# Patient Record
Sex: FEMALE | Race: ASIAN | NOT HISPANIC OR LATINO | Employment: FULL TIME | URBAN - METROPOLITAN AREA
[De-identification: names, ages, dates, MRNs, and addresses within clinical notes are randomized per-mention and may not be internally consistent; named-entity substitution may affect disease eponyms.]

---

## 2019-07-09 ENCOUNTER — OFFICE VISIT (OUTPATIENT)
Dept: FAMILY MEDICINE | Facility: CLINIC | Age: 44
End: 2019-07-09
Payer: COMMERCIAL

## 2019-07-09 VITALS
HEIGHT: 64 IN | BODY MASS INDEX: 34.31 KG/M2 | TEMPERATURE: 98.7 F | SYSTOLIC BLOOD PRESSURE: 144 MMHG | WEIGHT: 201 LBS | DIASTOLIC BLOOD PRESSURE: 82 MMHG | HEART RATE: 94 BPM | OXYGEN SATURATION: 99 %

## 2019-07-09 DIAGNOSIS — R07.0 THROAT PAIN: Primary | ICD-10-CM

## 2019-07-09 DIAGNOSIS — J02.9 ACUTE PHARYNGITIS, UNSPECIFIED ETIOLOGY: ICD-10-CM

## 2019-07-09 LAB
DEPRECATED S PYO AG THROAT QL EIA: NORMAL
SPECIMEN SOURCE: NORMAL

## 2019-07-09 PROCEDURE — 87880 STREP A ASSAY W/OPTIC: CPT | Performed by: FAMILY MEDICINE

## 2019-07-09 PROCEDURE — 87081 CULTURE SCREEN ONLY: CPT | Performed by: FAMILY MEDICINE

## 2019-07-09 PROCEDURE — 99203 OFFICE O/P NEW LOW 30 MIN: CPT | Performed by: FAMILY MEDICINE

## 2019-07-09 RX ORDER — AZITHROMYCIN 250 MG/1
TABLET, FILM COATED ORAL
Qty: 6 TABLET | Refills: 0 | Status: SHIPPED | OUTPATIENT
Start: 2019-07-09 | End: 2023-03-16

## 2019-07-09 ASSESSMENT — MIFFLIN-ST. JEOR: SCORE: 1557.76

## 2019-07-09 NOTE — PROGRESS NOTES
"Subjective     Aleta South is a 43 year old female who presents to clinic today for the following health issues:    HPI   Acute Illness   Acute illness concerns: possible strep  Onset: yesterday    Fever: no    Chills/Sweats: no    Headache (location?): no    Sinus Pressure:no    Conjunctivitis:  no    Ear Pain: YES: right    Rhinorrhea: no    Congestion: YES    Sore Throat: YES     Cough: YES-non-productive    Wheeze: YES    Decreased Appetite: YES    Nausea: no    Vomiting: no    Diarrhea:  no    Dysuria/Freq.: no    Fatigue/Achiness: YES    Sick/Strep Exposure: no     Therapies Tried and outcome: ibuprofen    There is no problem list on file for this patient.    History reviewed. No pertinent surgical history.    Social History     Tobacco Use     Smoking status: Never Smoker     Smokeless tobacco: Never Used   Substance Use Topics     Alcohol use: Yes     History reviewed. No pertinent family history.      Reviewed and updated as needed this visit by Provider  Tobacco  Allergies  Meds  Problems  Med Hx  Surg Hx  Fam Hx         Review of Systems   ROS COMP: Constitutional, HEENT, cardiovascular, pulmonary, gi and gu systems are negative, except as otherwise noted.      Objective    /82   Pulse 94   Temp 98.7  F (37.1  C) (Oral)   Ht 1.619 m (5' 3.75\")   Wt 91.2 kg (201 lb)   SpO2 99%   BMI 34.77 kg/m    Body mass index is 34.77 kg/m .  Physical Exam   GENERAL: healthy, alert and no distress  EYES: Eyes grossly normal to inspection, PERRL and conjunctivae and sclerae normal  HENT: normal cephalic/atraumatic, ear canals and TM's normal, nasal mucosa edematous , oropharynx clear, oral mucous membranes moist and tonsillar hypertrophy  NECK: no adenopathy and no asymmetry, masses, or scars  RESP: lungs clear to auscultation - no rales, rhonchi or wheezes  CV: regular rate and rhythm, normal S1 S2, no S3 or S4, no murmur, click or rub, no peripheral edema and peripheral pulses strong  MS: no " gross musculoskeletal defects noted, no edema  PSYCH: mentation appears normal, affect normal/bright  Voice is somewhat muffled when she talks    Diagnostic Test Results:  Strep screen - Negative        Assessment & Plan     1. Throat pain  - Strep, Rapid Screen  - Beta strep group A culture    2. Acute pharyngitis, unspecified etiology: given rapid onset and voice changes due to painful throat, treat for suspected bacterial etiology. Will follow up on culture. Otherwise, continue symptomatic cares - PRN ibuprofen/acetaminophen, salt water gargles, push fluids  - azithromycin (ZITHROMAX) 250 MG tablet; Two tablets first day, then one tablet daily for four days.  Dispense: 6 tablet; Refill: 0         Return in about 1 week (around 7/16/2019) for follow up if symptoms not improving.    Daniel Yao,   [facility] MARCIO

## 2019-07-10 LAB
BACTERIA SPEC CULT: NORMAL
SPECIMEN SOURCE: NORMAL

## 2019-12-23 ENCOUNTER — TELEPHONE (OUTPATIENT)
Dept: FAMILY MEDICINE | Facility: CLINIC | Age: 44
End: 2019-12-23

## 2019-12-23 NOTE — TELEPHONE ENCOUNTER
Needs of attention regarding:  -Cervical Cancer Screening  -Wellness (Physical) Visit     Health Maintenance Topics with due status: Overdue       Topic Date Due    PREVENTIVE CARE VISIT 1977    DTAP/TDAP/TD IMMUNIZATION 12/23/1988    HIV SCREENING 12/23/1992    HPV 12/23/1998    PAP 12/23/2002    PHQ-2 01/01/2019    INFLUENZA VACCINE 09/01/2019       Communication:  See Letter

## 2019-12-23 NOTE — LETTER
[facility]  [address]  [place] [id number] [phone]  December 23, 2019    Aleta South  [address]  [place] [address]    Dear Aleta,    I care about your health and have reviewed your health plan. I have reviewed your medical conditions, medication list, and lab results and am making recommendations based on this review, to better manage your health.    You are in particular need of attention regarding:  -Cervical Cancer Screening  -Wellness (Physical) Visit   I am recommending that you:  -schedule a WELLNESS (Physical) APPOINTMENT with me.   I will check fasting labs the same day - nothing to eat except water and meds for 8-10 hours prior.    -schedule a PAP SMEAR EXAM which is due.  Please disregard this reminder if you have had this exam elsewhere within the last year.  It would be helpful for us to have a copy of your recent pap smear report in our file so that we can best coordinate your care.    If you are under/uninsured, we recommend you contact the [facility]. They offer pap smears at no charge or on a sliding fee charge. You can schedule with them at [phone]. Please have them send us the results.  Please call us at [phone] (or use [facility]) to address the above recommendations.   Thank you for trusting [facility] and we appreciate the opportunity to serve you.  We look forward to supporting your healthcare needs in the future.    Healthy Regards,    [facility] Care Team

## 2021-04-17 ENCOUNTER — IMMUNIZATION (OUTPATIENT)
Dept: NURSING | Facility: CLINIC | Age: 46
End: 2021-04-17
Payer: COMMERCIAL

## 2021-04-17 PROCEDURE — 0011A PR COVID VAC MODERNA 100 MCG/0.5 ML IM: CPT

## 2021-04-17 PROCEDURE — 91301 PR COVID VAC MODERNA 100 MCG/0.5 ML IM: CPT

## 2021-05-08 ENCOUNTER — HEALTH MAINTENANCE LETTER (OUTPATIENT)
Age: 46
End: 2021-05-08

## 2021-05-15 ENCOUNTER — IMMUNIZATION (OUTPATIENT)
Dept: NURSING | Facility: CLINIC | Age: 46
End: 2021-05-15
Attending: INTERNAL MEDICINE
Payer: COMMERCIAL

## 2021-05-15 PROCEDURE — 0012A PR COVID VAC MODERNA 100 MCG/0.5 ML IM: CPT

## 2021-05-15 PROCEDURE — 91301 PR COVID VAC MODERNA 100 MCG/0.5 ML IM: CPT

## 2021-10-23 ENCOUNTER — HEALTH MAINTENANCE LETTER (OUTPATIENT)
Age: 46
End: 2021-10-23

## 2022-01-24 ENCOUNTER — NURSE TRIAGE (OUTPATIENT)
Dept: NURSING | Facility: CLINIC | Age: 47
End: 2022-01-24
Payer: COMMERCIAL

## 2022-01-24 NOTE — TELEPHONE ENCOUNTER
Patient injured her foot and has an appointment for Wed of this week. Calling today to see if she can go to an ortho specialist. Advised patient see a primary provider first.  No fever or redness or warmth.    No triage done.    Tonya Torres RN  01/24/22 11:59 AM  [facility] Nurse Advisor    Additional Information    Information only question and nurse able to answer    Protocols used: INFORMATION ONLY CALL - NO TRIAGE-A-OH    COVID 19 Nurse Triage Plan/Patient Instructions    Please be aware that novel coronavirus (COVID-19) may be circulating in the community. If you develop symptoms such as fever, cough, or SOB or if you have concerns about the presence of another infection including coronavirus (COVID-19), please contact your health care provider or visit [email or web address].     Disposition/Instructions    In-Person Visit with provider recommended. Reference Visit Selection Guide.    Thank you for taking steps to prevent the spread of this virus.  o Limit your contact with others.  o Wear a simple mask to cover your cough.  o Wash your hands well and often.    Resources    [facility]: About COVID-19: [email or web address]    CDC: What to Do If You're Sick: www.cdc.gov/coronavirus/2019-ncov/about/steps-when-sick.html    CDC: Ending Home Isolation: www.cdc.gov/coronavirus/2019-ncov/hcp/disposition-in-home-patients.html     CDC: Caring for Someone: www.cdc.gov/coronavirus/2019-ncov/if-you-are-sick/care-for-someone.html     [facility]: Interim Guidance for Hospital Discharge to Home: www.health.[facility].[place].us/diseases/coronavirus/hcp/hospdischarge.pdf    [facility] clinical trials (COVID-19 research studies): [email or web address]/umn-[email or web address]-trials     Below are the COVID-19 hotlines at the [facility] ([facility]). Interpreters are available.   o For health questions: Call 163-933-7342 or [phone] (7 a.m. to 7 p.m.)  o For questions about schools and  childcare: Call 643-187-6563 or [phone] (7 a.m. to 7 p.m.)

## 2022-06-04 ENCOUNTER — HEALTH MAINTENANCE LETTER (OUTPATIENT)
Age: 47
End: 2022-06-04

## 2022-10-10 ENCOUNTER — HEALTH MAINTENANCE LETTER (OUTPATIENT)
Age: 47
End: 2022-10-10

## 2023-03-13 ENCOUNTER — OFFICE VISIT (OUTPATIENT)
Dept: FAMILY MEDICINE | Facility: CLINIC | Age: 48
End: 2023-03-13
Payer: COMMERCIAL

## 2023-03-13 ENCOUNTER — ANCILLARY PROCEDURE (OUTPATIENT)
Dept: GENERAL RADIOLOGY | Facility: CLINIC | Age: 48
End: 2023-03-13
Attending: FAMILY MEDICINE
Payer: COMMERCIAL

## 2023-03-13 ENCOUNTER — TELEPHONE (OUTPATIENT)
Dept: FAMILY MEDICINE | Facility: CLINIC | Age: 48
End: 2023-03-13

## 2023-03-13 ENCOUNTER — TELEPHONE (OUTPATIENT)
Dept: DERMATOLOGY | Facility: CLINIC | Age: 48
End: 2023-03-13

## 2023-03-13 VITALS
SYSTOLIC BLOOD PRESSURE: 150 MMHG | WEIGHT: 224.3 LBS | BODY MASS INDEX: 38.29 KG/M2 | HEIGHT: 64 IN | TEMPERATURE: 98.8 F | HEART RATE: 83 BPM | DIASTOLIC BLOOD PRESSURE: 108 MMHG | RESPIRATION RATE: 18 BRPM | OXYGEN SATURATION: 98 %

## 2023-03-13 DIAGNOSIS — M79.672 LEFT FOOT PAIN: ICD-10-CM

## 2023-03-13 DIAGNOSIS — Z13.9 SCREENING FOR CONDITION: ICD-10-CM

## 2023-03-13 DIAGNOSIS — I10 BENIGN ESSENTIAL HYPERTENSION: ICD-10-CM

## 2023-03-13 DIAGNOSIS — S92.355A CLOSED NONDISPLACED FRACTURE OF FIFTH METATARSAL BONE OF LEFT FOOT, INITIAL ENCOUNTER: ICD-10-CM

## 2023-03-13 DIAGNOSIS — L72.3 SEBACEOUS CYST: ICD-10-CM

## 2023-03-13 DIAGNOSIS — S92.355D CLOSED NONDISPLACED FRACTURE OF FIFTH METATARSAL BONE OF LEFT FOOT WITH ROUTINE HEALING, SUBSEQUENT ENCOUNTER: Primary | ICD-10-CM

## 2023-03-13 DIAGNOSIS — M79.672 LEFT FOOT PAIN: Primary | ICD-10-CM

## 2023-03-13 LAB
ANION GAP SERPL CALCULATED.3IONS-SCNC: 13 MMOL/L (ref 7–15)
BUN SERPL-MCNC: 6.7 MG/DL (ref 6–20)
CALCIUM SERPL-MCNC: 9.7 MG/DL (ref 8.6–10)
CHLORIDE SERPL-SCNC: 103 MMOL/L (ref 98–107)
CHOLEST SERPL-MCNC: 250 MG/DL
CREAT SERPL-MCNC: 0.68 MG/DL (ref 0.51–0.95)
DEPRECATED HCO3 PLAS-SCNC: 23 MMOL/L (ref 22–29)
GFR SERPL CREATININE-BSD FRML MDRD: >90 ML/MIN/1.73M2
GLUCOSE SERPL-MCNC: 119 MG/DL (ref 70–99)
HDLC SERPL-MCNC: 45 MG/DL
LDLC SERPL CALC-MCNC: 164 MG/DL
NONHDLC SERPL-MCNC: 205 MG/DL
POTASSIUM SERPL-SCNC: 4 MMOL/L (ref 3.4–5.3)
SODIUM SERPL-SCNC: 139 MMOL/L (ref 136–145)
TRIGL SERPL-MCNC: 204 MG/DL

## 2023-03-13 PROCEDURE — 80061 LIPID PANEL: CPT | Performed by: FAMILY MEDICINE

## 2023-03-13 PROCEDURE — 36415 COLL VENOUS BLD VENIPUNCTURE: CPT | Performed by: FAMILY MEDICINE

## 2023-03-13 PROCEDURE — 73630 X-RAY EXAM OF FOOT: CPT | Mod: TC | Performed by: RADIOLOGY

## 2023-03-13 PROCEDURE — 80048 BASIC METABOLIC PNL TOTAL CA: CPT | Performed by: FAMILY MEDICINE

## 2023-03-13 PROCEDURE — 99203 OFFICE O/P NEW LOW 30 MIN: CPT | Performed by: FAMILY MEDICINE

## 2023-03-13 RX ORDER — LISINOPRIL 10 MG/1
10 TABLET ORAL DAILY
Qty: 90 TABLET | Refills: 1 | Status: SHIPPED | OUTPATIENT
Start: 2023-03-13 | End: 2023-09-03

## 2023-03-13 RX ORDER — SULFAMETHOXAZOLE/TRIMETHOPRIM 800-160 MG
1 TABLET ORAL 2 TIMES DAILY
Qty: 14 TABLET | Refills: 0 | Status: SHIPPED | OUTPATIENT
Start: 2023-03-13

## 2023-03-13 ASSESSMENT — PAIN SCALES - GENERAL: PAINLEVEL: NO PAIN (0)

## 2023-03-13 ASSESSMENT — ENCOUNTER SYMPTOMS
AGITATION: 0
COLOR CHANGE: 1

## 2023-03-13 NOTE — PROGRESS NOTES
"  Assessment & Plan     Left foot pain    - XR Foot Left G/E 3 Views; Future  - Ankle/Foot Bracing Supplies DME Walking Boot; Left; Pneumatic; Short    Sebaceous cyst  - no fluctuation     - Adult Dermatology Referral; Future  - sulfamethoxazole-trimethoprim (BACTRIM DS) 800-160 MG tablet; Take 1 tablet by mouth 2 times daily    Benign essential hypertension  Blood pressure above goal .  Patient will make follow up with primary at [place] for follow up   - lisinopril (ZESTRIL) 10 MG tablet; Take 1 tablet (10 mg) by mouth daily  - Basic metabolic panel  (Ca, Cl, CO2, Creat, Gluc, K, Na, BUN)    Screening for condition  - Lipid panel reflex to direct LDL Non-fasting    Closed nondisplaced fracture of fifth metatarsal bone of left foot, initial encounter  - 5th metatarsal fracture   Mild displacement   Rest , ice , non weight bearing   Repeat xray 1 week .       BMI:   Estimated body mass index is 38.2 kg/m  as calculated from the following:    Height as of this encounter: 1.632 m (5' 4.25\").    Weight as of this encounter: 101.7 kg (224 lb 4.8 oz).   Weight management plan: Discussed healthy diet and exercise guidelines      Return in about 4 weeks (around 4/10/2023) for Follow up.    Rosa Perez MD  [facility] NASEEM River is a 47 year old, presenting for the following health issues:  Foot Burn (Left foot 5 metatarsal bruising and pain post a fall 3/12/2023. ) and Mass (On the back )      Mass    History of Present Illness       Reason for visit:  Hurt my foot  Symptom onset:  1-3 days ago    She eats 4 or more servings of fruits and vegetables daily.She consumes 2 sweetened beverage(s) daily.She exercises with enough effort to increase her heart rate 20 to 29 minutes per day.  She exercises with enough effort to increase her heart rate 3 or less days per week.   She is taking medications regularly.         Review of Systems   Musculoskeletal:        Left foot pain , erythema " ".   Skin: Positive for color change.   Psychiatric/Behavioral: Negative for agitation and behavioral problems.            Objective    BP (!) 150/108 (BP Location: Right arm, Patient Position: Sitting, Cuff Size: Adult Large)   Pulse 83   Temp 98.8  F (37.1  C) (Oral)   Resp 18   Ht 1.632 m (5' 4.25\")   Wt 101.7 kg (224 lb 4.8 oz)   LMP 03/06/2023 (Approximate)   SpO2 98%   BMI 38.20 kg/m    Body mass index is 38.2 kg/m .  Physical Exam  Cardiovascular:      Rate and Rhythm: Normal rate and regular rhythm.      Pulses: Normal pulses.   Pulmonary:      Effort: Pulmonary effort is normal.   Abdominal:      General: Abdomen is flat.   Musculoskeletal:      Comments: Erythema ,bruising 5 th metatarsal .   Skin:     General: Skin is warm.      Findings: Bruising and erythema present.                "

## 2023-03-13 NOTE — TELEPHONE ENCOUNTER
----- Message from Rosa Perez MD sent at 3/13/2023  2:28 PM CDT -----  Team     I was unable to reach patient .   Please explain her metatarsal fracture is mildly displaced as discussed in visit .  Usually for mild displacement we recommend xray in 1 week to make sure there is no further displacement .    Patient option is to see a foot doctor or I can order that xray for follow up in 1 week .  Will place order once agreed by patient .    Thanks   Rosa Perez MD.

## 2023-03-13 NOTE — TELEPHONE ENCOUNTER
Attempted to call pt, LVMTCB.     Aroldo ROONEY RN     MEDICATIONS  (STANDING):  Desonide ointment 0.05% 1 Application(s) 1 Application(s) Topical daily  loratadine 10 milliGRAM(s) Oral daily  pimecrolimus 1% Cream 1 Application(s) Topical daily  sertraline 125 milliGRAM(s) Oral daily    MEDICATIONS  (PRN):  ALBUTerol    90 MICROgram(s) HFA Inhaler 2 Puff(s) Inhalation every 6 hours PRN asthma  aluminum hydroxide/magnesium hydroxide/simethicone Suspension 30 milliLiter(s) Oral every 6 hours PRN Dyspepsia  chlorproMAZINE    Injectable 50 milliGRAM(s) IntraMuscular once PRN agitation  chlorproMAZINE    Tablet 50 milliGRAM(s) Oral every 6 hours PRN agitation, anxiety  diphenhydrAMINE 50 milliGRAM(s) Oral every 6 hours PRN agitation  diphenhydrAMINE Injectable 50 milliGRAM(s) IntraMuscular once PRN Agitation  ibuprofen  Tablet. 200 milliGRAM(s) Oral every 6 hours PRN Mild Pain (1 - 3), Moderate Pain (4 - 6)  LORazepam     Tablet 2 milliGRAM(s) Oral every 6 hours PRN agitation, anxiety  ondansetron    Tablet 8 milliGRAM(s) Oral every 12 hours PRN Nausea  senna 2 Tablet(s) Oral at bedtime PRN Constipation

## 2023-03-13 NOTE — TELEPHONE ENCOUNTER
Spoke to patient . Repeat xray in 1 week as there is some displacement in the metatarsal fracture .

## 2023-03-13 NOTE — TELEPHONE ENCOUNTER
This encounter is being sent to inform the clinic that this patient has a referral from DOMINIQUE ARMENDARIZ for the diagnoses of L72.3 (ICD-10-CM) - Sebaceous cyst and has requested that this patient be seen within Priority: 1-2 Weeks .  Based on the availability of our provider(s), we are unable to accommodate this request.      Were all sites offered this patient?  Yes      Does scheduling algorithm request to schedule next available?  Patient appointment has not been scheduled.  Please review the referral request for accommodation and contact the patient.  If unable to accommodate, please resubmit a referral and indicate a preferred partner or affiliate location using Provider Finder or Scheduling Instructions field.

## 2023-03-16 ENCOUNTER — OFFICE VISIT (OUTPATIENT)
Dept: DERMATOLOGY | Facility: CLINIC | Age: 48
End: 2023-03-16
Payer: COMMERCIAL

## 2023-03-16 DIAGNOSIS — L72.0 EIC (EPIDERMAL INCLUSION CYST): ICD-10-CM

## 2023-03-16 DIAGNOSIS — L70.0 ACNE VULGARIS: Primary | ICD-10-CM

## 2023-03-16 PROCEDURE — 11900 INJECT SKIN LESIONS </W 7: CPT | Mod: GC | Performed by: STUDENT IN AN ORGANIZED HEALTH CARE EDUCATION/TRAINING PROGRAM

## 2023-03-16 PROCEDURE — 99204 OFFICE O/P NEW MOD 45 MIN: CPT | Mod: 25 | Performed by: STUDENT IN AN ORGANIZED HEALTH CARE EDUCATION/TRAINING PROGRAM

## 2023-03-16 RX ORDER — DOXYCYCLINE 100 MG/1
CAPSULE ORAL
Qty: 60 CAPSULE | Refills: 0 | Status: SHIPPED | OUTPATIENT
Start: 2023-03-16

## 2023-03-16 NOTE — LETTER
3/16/2023       RE: Aleta South  [address]  [place] [address]     Dear Colleague,    Thank you for referring your patient, Aleta South, to the [facility] [place] [facility]. Please see a copy of my visit note below.    [facility] Dermatology Note  Encounter Date: Mar 16, 2023  Date of Last Dermatology Office Visit: Visit date not found     Dermatology Problem List:  #. EIC, inflamed   S/p 60 day course of doxycycline started 3/16/2023   Plan for excision for definitive removal, surgery scheduling pool messaged  # Acne, upper back  Start BPO wash and clindamycin lotion.     ____________________________________________    Assessment & Plan:     #. Epidermal inclusion cyst, inflamed  Counseled patient on etiology and expectations, advising that we treat with a course of antibiotics, largely for anti-inflammatory purposes   -Start doxycycline 100 mg BID for one month.   -S/p injection 0.7 ml of ILK 10 today; see procedure note below.     #. Acne vulgaris  Assessment:   Primarily affecting the upper back; without evidence of scarring. Subtype best categorized as inflammatory. Patient without notable psychological burden from condition.   Plan:   -Topical treatments:  -Start benzoyl peroxide wash, 5%, to be used once daily.   -Start topical clindamycin, to be used twice daily.     Procedures Performed:  - Intra-lesional triamcinolone procedure note. After verbal consent and review of risk of pain and skin thinning/atrophy, positioning and cleansing with isopropyl alcohol, 0.7 total mL of triamcinolone 10 mg/mL was injected into 1 lesion(s) on the back. The patient tolerated the procedure well and left the dermatology clinic in good condition.    Follow-up: for cyst excision, likely in early May (soonest available).     Staff and Resident:     Staff: Dr. Feliberto Giang MD  PGY-3,  Internal Medicine-Dermatology  Pager: TextPage Link  ____________________________________________    CC: Derm Problem (Cyst on back that patient has a concern about. Patient stated that the cyst is currently inflamed and has been present since February 2023.)      HPI:  Ms. Aleta South is a(n) 47 year old female who presents today as a new patient (or re-establishing care following two year hiatus) for the following chief complaint(s):     Derm Problem (Cyst on back that patient has a concern about. Patient stated that the cyst is currently inflamed and has been present since February 2023.)    Patient was started on Bactrim by an outside provider about 5 days ago, she notes that since starting the medication, there has been minimal improvement in the inflammation or pain of the lesion, however she cannot see it very well.  Notes that she gets acne on the upper back and shoulders frequently, which she attributes to sweating after working out.    Patient is otherwise feeling well, without additional skin concerns.    Labs Reviewed:  N/A    Physical Exam:  Vitals: LMP 03/06/2023 (Approximate)   General: Well developed adult. Resting comfortably; no acute distress. Conversational and cooperative with exam.  HEENT: Anicteric sclera. EOMI. Mucous membranes moist.   Skin Exam: A focused exam of the the back was performed.   - Foster Type IV: Sun-occluded skin with moderate background pigmentation. .   - Patient with greater than 100 nevi  - At the right lower scapula there is a 3-4 mm erythematous nodule, slightly tender to palpation. Central punctum observed.   - At the upper back and shoulders there are several erythematous papules and pustules in various stages of h ealing.   - No other lesions of concern on areas examined.   Psych: Appropriate mood and affect for situation.    Medications:  Current Outpatient Medications   Medication     doxycycline monohydrate (MONODOX) 100 MG capsule      sulfamethoxazole-trimethoprim (BACTRIM DS) 800-160 MG tablet     lisinopril (ZESTRIL) 10 MG tablet     Current Facility-Administered Medications   Medication     triamcinolone acetonide (KENALOG-10) injection 10 mg        Past Medical History:   There is no problem list on file for this patient.    History reviewed. No pertinent past medical history.    CC Rosa Perez MD  [address] [place] [address] on close of this encounter.    Drug Administration Record    Prior to injection, verified patient identity using patient's name and date of birth.  Due to injection administration, patient instructed to remain in clinic for 15 minutes  afterwards, and to report any adverse reaction to me immediately.    Drug Name: triamcinolone acetonide(kenalog)  Dose: 1 mL of triamcinolone 10mg/mL, 10 mg dose  Route administered: See provider notes  NDC #: Kenalog-10 (6967-7259-59)  Amount of waste(mL):4 mL  Reason for waste: Multi dose vial    LOT #: [id number]  SITE: See provider notes  : [employer]  EXPIRATION DATE: 01 Aug 2024      Attestation signed by Feliberto Moon MD at 3/16/2023 11:15 AM:  I have personally examined this patient and agree with the resident doctor's documentation and plan of care. I have reviewed and amended the resident's note. The documentation accurately reflects my clinical observations, diagnoses, treatment and follow-up plans. I was present for key portions of the procedure(s).       Feliberto Moon MD  Dermatology Staff

## 2023-03-16 NOTE — NURSING NOTE
Dermatology Rooming Note    Aleta South's goals for this visit include:   Chief Complaint   Patient presents with     Derm Problem     Cyst on back that patient has a concern about. Patient stated that the cyst is currently inflamed and has been present since February 2023.     Kyle Horowitz, EMT-B

## 2023-03-16 NOTE — Clinical Note
Ready for signing Dr. Mono. Schedulers, please overbook in Dr. Sosa's next surgery clinic. Case was discussed and permission obtained.

## 2023-03-16 NOTE — PATIENT INSTRUCTIONS
Start taking the doxycycline 100mg twice a day with food for the next month.     Start the BPO wash and the clindamycin lotion to other acne areas at the upper back.     Our surgery schedulers will be reaching out to you when we find an open slot for your procedure.     Your cyst may start to drain; if it does, just apply Band-Aid and warm compresses as needed. Do not try to manually extract it yourself.

## 2023-03-16 NOTE — PROGRESS NOTES
[facility] Dermatology Note  Encounter Date: Mar 16, 2023  Date of Last Dermatology Office Visit: Visit date not found     Dermatology Problem List:  #. EIC, inflamed   S/p 60 day course of doxycycline started 3/16/2023   Plan for excision for definitive removal, surgery scheduling pool messaged  # Acne, upper back  Start BPO wash and clindamycin lotion.     ____________________________________________    Assessment & Plan:     #. Epidermal inclusion cyst, inflamed  Counseled patient on etiology and expectations, advising that we treat with a course of antibiotics, largely for anti-inflammatory purposes   -Start doxycycline 100 mg BID for one month.   -S/p injection 0.7 ml of ILK 10 today; see procedure note below.     #. Acne vulgaris  Assessment:   Primarily affecting the upper back; without evidence of scarring. Subtype best categorized as inflammatory. Patient without notable psychological burden from condition.   Plan:   -Topical treatments:  -Start benzoyl peroxide wash, 5%, to be used once daily.   -Start topical clindamycin, to be used twice daily.     Procedures Performed:  - Intra-lesional triamcinolone procedure note. After verbal consent and review of risk of pain and skin thinning/atrophy, positioning and cleansing with isopropyl alcohol, 0.7 total mL of triamcinolone 10 mg/mL was injected into 1 lesion(s) on the back. The patient tolerated the procedure well and left the dermatology clinic in good condition.    Follow-up: for cyst excision, likely in early May (soonest available).     Staff and Resident:     Staff: Dr. Feliberto Giang MD  PGY-3, Internal Medicine-Dermatology  Pager: TextPage Link  ____________________________________________    CC: Derm Problem (Cyst on back that patient has a concern about. Patient stated that the cyst is currently inflamed and has been present since February 2023.)      HPI:  Ms. Aleta South is a(n) 47 year old female  who presents today as a new patient (or re-establishing care following two year hiatus) for the following chief complaint(s):     Derm Problem (Cyst on back that patient has a concern about. Patient stated that the cyst is currently inflamed and has been present since February 2023.)    Patient was started on Bactrim by an outside provider about 5 days ago, she notes that since starting the medication, there has been minimal improvement in the inflammation or pain of the lesion, however she cannot see it very well.  Notes that she gets acne on the upper back and shoulders frequently, which she attributes to sweating after working out.    Patient is otherwise feeling well, without additional skin concerns.    Labs Reviewed:  N/A    Physical Exam:  Vitals: LMP 03/06/2023 (Approximate)   General: Well developed adult. Resting comfortably; no acute distress. Conversational and cooperative with exam.  HEENT: Anicteric sclera. EOMI. Mucous membranes moist.   Skin Exam: A focused exam of the the back was performed.   - Foster Type IV: Sun-occluded skin with moderate background pigmentation. .   - Patient with greater than 100 nevi  - At the right lower scapula there is a 3-4 mm erythematous nodule, slightly tender to palpation. Central punctum observed.   - At the upper back and shoulders there are several erythematous papules and pustules in various stages of h ealing.   - No other lesions of concern on areas examined.   Psych: Appropriate mood and affect for situation.    Medications:  Current Outpatient Medications   Medication     doxycycline monohydrate (MONODOX) 100 MG capsule     sulfamethoxazole-trimethoprim (BACTRIM DS) 800-160 MG tablet     lisinopril (ZESTRIL) 10 MG tablet     Current Facility-Administered Medications   Medication     triamcinolone acetonide (KENALOG-10) injection 10 mg        Past Medical History:   There is no problem list on file for this patient.    History reviewed. No pertinent past  medical history.    CC Rosa Perez MD  [address] [place] [address] on close of this encounter.

## 2023-03-16 NOTE — PROGRESS NOTES
Drug Administration Record    Prior to injection, verified patient identity using patient's name and date of birth.  Due to injection administration, patient instructed to remain in clinic for 15 minutes  afterwards, and to report any adverse reaction to me immediately.    Drug Name: triamcinolone acetonide(kenalog)  Dose: 1 mL of triamcinolone 10mg/mL, 10 mg dose  Route administered: See provider notes  NDC #: Kenalog-10 (3352-7138-50)  Amount of waste(mL):4 mL  Reason for waste: Multi dose vial    LOT #: [id number]  SITE: See provider notes  : [employer]  EXPIRATION DATE: 01 Aug 2024

## 2023-03-20 ENCOUNTER — TELEPHONE (OUTPATIENT)
Dept: DERMATOLOGY | Facility: CLINIC | Age: 48
End: 2023-03-20
Payer: COMMERCIAL

## 2023-03-20 NOTE — TELEPHONE ENCOUNTER
Left vm to call back and Schedule with DR. Sosa.     Ready for signing Dr. Moon. Schedulers, please overbook in Dr. Sosa's next surgery clinic. Case was discussed and permission obtained.       Morena Núñez, Procedure  3/20/2023 10:45 AM

## 2023-03-21 NOTE — TELEPHONE ENCOUNTER
Called and spoke to pt. Scheduled for procedure on 05/04 with Dr. Sosa.     Morena Núñez, Procedure  3/21/2023 1:28 PM

## 2023-03-25 ENCOUNTER — HEALTH MAINTENANCE LETTER (OUTPATIENT)
Age: 48
End: 2023-03-25

## 2023-06-11 ENCOUNTER — HEALTH MAINTENANCE LETTER (OUTPATIENT)
Age: 48
End: 2023-06-11

## 2023-07-19 NOTE — TELEPHONE ENCOUNTER
Pt returning call, relayed message below. Pt would like clarification as she thought it was discussed to follow up in one month, please clarify. Pt agreeable to follow up xray ordered by provider.     Aroldo ROONEY RN     Advancement-Rotation Flap Text: The defect edges were debeveled with a #15 scalpel blade. Given the location of the defect, shape of the defect and the proximity to free margins an advancement-rotation flap was deemed most appropriate. Using a sterile surgical marker, an appropriate flap was drawn incorporating the defect and placing the expected incisions within the relaxed skin tension lines where possible. The area thus outlined was incised deep to adipose tissue with a #15 scalpel blade. The skin margins were undermined to an appropriate distance in all directions utilizing iris scissors. Following this, the designed flap was carried over into the primary defect and sutured into place.

## 2023-09-03 DIAGNOSIS — I10 BENIGN ESSENTIAL HYPERTENSION: ICD-10-CM

## 2023-09-05 RX ORDER — LISINOPRIL 10 MG/1
10 TABLET ORAL DAILY
Qty: 90 TABLET | Refills: 1 | Status: SHIPPED | OUTPATIENT
Start: 2023-09-05 | End: 2024-03-02

## 2024-03-02 DIAGNOSIS — I10 BENIGN ESSENTIAL HYPERTENSION: ICD-10-CM

## 2024-03-04 RX ORDER — LISINOPRIL 10 MG/1
10 TABLET ORAL DAILY
Qty: 90 TABLET | Refills: 0 | Status: SHIPPED | OUTPATIENT
Start: 2024-03-04

## 2024-08-04 ENCOUNTER — HEALTH MAINTENANCE LETTER (OUTPATIENT)
Age: 49
End: 2024-08-04

## 2025-04-12 ENCOUNTER — HEALTH MAINTENANCE LETTER (OUTPATIENT)
Age: 50
End: 2025-04-12

## 2025-08-16 ENCOUNTER — HEALTH MAINTENANCE LETTER (OUTPATIENT)
Age: 50
End: 2025-08-16